# Patient Record
(demographics unavailable — no encounter records)

---

## 2025-05-06 NOTE — DISCUSSION/SUMMARY
[de-identified] : Kennedi Pressley is a 60-year-old female who presented to the office for follow-up of her left knee patella ORIF.  Overall, patient has been able to return to most activities.  She is able to straight leg raise.  X-rays showed patellar ORIF and hardware intact.  There is a persistent fracture line and reabsorption around the fracture.  Examination showed the patient is able to straight leg raise and has good knee range of motion.  Discussed with the patient the examination and imaging findings.  Discussed with patient that there is a persistent fracture line.  Discussed that her function is intact and she has been able to straight leg raise.  Discussed the management of her patellar ORIF at this time.  Patient will continue to be weightbearing as tolerated on the left lower extremity.  She will continue her activities as tolerated.  Discussed continued monitoring of her fracture line.   Discussed patient's prominent hardware.  Discussed that if patient develops skin issues or pain over the hardware, then could consider hardware removal.  Discussed complexity of hardware removal and risk of patella fracture/injury.  Discussed continuing to monitor prominent hardware. Patient will follow-up in 2 years for reevaluation and management.  Patient understanding and in agreement the plan.  All questions answered.  Plan: -Left lower extremity: Weightbearing as tolerated -Activity as tolerated -Follow up with Dr. Barrientos -Monitor prominent hardware -Follow-up in 2 years for reevaluation and management

## 2025-05-06 NOTE — DISCUSSION/SUMMARY
[de-identified] : Kennedi Pressley is a 60-year-old female who presented to the office for follow-up of her left knee patella ORIF.  Overall, patient has been able to return to most activities.  She is able to straight leg raise.  X-rays showed patellar ORIF and hardware intact.  There is a persistent fracture line and reabsorption around the fracture.  Examination showed the patient is able to straight leg raise and has good knee range of motion.  Discussed with the patient the examination and imaging findings.  Discussed with patient that there is a persistent fracture line.  Discussed that her function is intact and she has been able to straight leg raise.  Discussed the management of her patellar ORIF at this time.  Patient will continue to be weightbearing as tolerated on the left lower extremity.  She will continue her activities as tolerated.  Discussed continued monitoring of her fracture line.   Discussed patient's prominent hardware.  Discussed that if patient develops skin issues or pain over the hardware, then could consider hardware removal.  Discussed complexity of hardware removal and risk of patella fracture/injury.  Discussed continuing to monitor prominent hardware. Patient will follow-up in 2 years for reevaluation and management.  Patient understanding and in agreement the plan.  All questions answered.  Plan: -Left lower extremity: Weightbearing as tolerated -Activity as tolerated -Follow up with Dr. Barrientos -Monitor prominent hardware -Follow-up in 2 years for reevaluation and management

## 2025-05-06 NOTE — PHYSICAL EXAM
[de-identified] : Constitutional: 61 year old female, alert and oriented, cooperative, in no acute distress.  HEENT  NC/AT. Appearance: symmetric  Neck/Back Straight without deformity or instability. Good ROM.  Chest/Respiratory  Respiratory effort: no intercostal retractions or use of accessory muscles. Nonlabored Breathing  Skin  On inspection, warm and dry without rashes or lesions.  Mental Status:  Judgment, insight: intact Orientation: oriented to time, place, and person  Neurological: Sensory and Motor are grossly intact throughout  Left Knee  Incision appears to be well healed. No erythema or drainage. There is prominent hardware over the patella.  Patient able to straight leg raise with 5/5 strength. No extensor lag.  No significant knee swelling  No significant calf swelling or tenderness  Range of Motion:       Extension: 0 degrees       Flexion: 120 degrees       Extensor La Degrees  Neurologic Exam  Motor intact including 5/5 Extensor Hallucis Longus, 5/5 Flexor Hallucis Longus, 5/5 Tibialis Anterior and 5/5 Gastrocnemius  Sensation Intact to Light Touch including Saphenous, Sural, Superficial Peroneal, Deep Peroneal, Tibial nerve distributions   Vascular Exam: 2+ DP Pulse [de-identified] : XRay: XRays of the Left Knee (3 views) taken today in the office. XRays show a left patella open reduction and internal fixation. Hardware appears intact without signs of loosening. There is no patella baja or patella wu. There is no displacement of the fracture. There has been some resorption of the fracture site and a persistent fracture line. (my personal interpretation).

## 2025-05-06 NOTE — PHYSICAL EXAM
[de-identified] : Constitutional: 61 year old female, alert and oriented, cooperative, in no acute distress.  HEENT  NC/AT. Appearance: symmetric  Neck/Back Straight without deformity or instability. Good ROM.  Chest/Respiratory  Respiratory effort: no intercostal retractions or use of accessory muscles. Nonlabored Breathing  Skin  On inspection, warm and dry without rashes or lesions.  Mental Status:  Judgment, insight: intact Orientation: oriented to time, place, and person  Neurological: Sensory and Motor are grossly intact throughout  Left Knee  Incision appears to be well healed. No erythema or drainage. There is prominent hardware over the patella.  Patient able to straight leg raise with 5/5 strength. No extensor lag.  No significant knee swelling  No significant calf swelling or tenderness  Range of Motion:       Extension: 0 degrees       Flexion: 120 degrees       Extensor La Degrees  Neurologic Exam  Motor intact including 5/5 Extensor Hallucis Longus, 5/5 Flexor Hallucis Longus, 5/5 Tibialis Anterior and 5/5 Gastrocnemius  Sensation Intact to Light Touch including Saphenous, Sural, Superficial Peroneal, Deep Peroneal, Tibial nerve distributions   Vascular Exam: 2+ DP Pulse [de-identified] : XRay: XRays of the Left Knee (3 views) taken today in the office. XRays show a left patella open reduction and internal fixation. Hardware appears intact without signs of loosening. There is no patella baja or patella wu. There is no displacement of the fracture. There has been some resorption of the fracture site and a persistent fracture line. (my personal interpretation).

## 2025-05-06 NOTE — HISTORY OF PRESENT ILLNESS
[de-identified] : 5/6/2025  Kennedi Pressley presents to the office for follow-up of her left knee patella ORIF.  Patient is currently doing well overall.  She is able to straight leg raise.  She does not have left knee pain.  Patient can have some right leg pain.  Pain is located over the back and radiates down the leg.  She will follow-up with Dr. Barrientos.  5/2/2024  Kennedi Pressley  presents to the office for follow-up of her left knee patella ORIF.  Patient is overall doing well.  She is able to walk.  She is able straight leg raise.  She can have some left knee pain.  She has been experiencing some left leg pain that radiates from the back to the foot.  She was previously seen by Dr. Barrientos.  She reports some paresthesias.  No falls.  No fevers or chills.  No issues from the hardware.  8/31/2023  Kennedi Pressley presented to the office for follow-up of her left knee patella ORIF.  Patient is overall doing well.  She does have some lateral knee pain.  Patient has been able to straight leg raise and walk well.  She is participating in activities as tolerated.  Her back pain is being managed by Dr. Barrientos and she is scheduled for an upcoming MRI.  6/20/2023  Kennedi Pressley presents to the office for follow-up of her left knee patella ORIF.  Patient is overall doing well.  She has minimal knee pain with walking.  She does have some lateral knee pain, near her IT band.  She is not having significant patella pain at this time.  Patient does have some irritation to the hardware over the anterolateral knee.  Patient states that her main complaint is her lumbar spine at this time.  She is currently being managed by Dr. Barrientos.  4/18/2023  Ms. Pressley presented to the office for follow-up of her left knee patella ORIF.  Patient is doing well.  She does have some anterolateral knee pain that is worse with hip and knee flexion.  She is walking and participating in most activities.  Patient remains able to straight leg raise.  She has completed her physical therapy.  3/16/2023 Ms. Pressley is a 59-year-old female presents to the office for follow-up of her left patella open reduction and internal fixation. Patient underwent left patella ORIF on 11/9/2022. Intraoperatively, there was noted to be significant comminution of the distal pole of the patella. Patient required a patellar tendon repair in addition to open reduction and internal fixation. Patient is currently doing well. She is undergoing outpatient physical therapy. Patient is currently walking with the [employer] brace 0-90 degrees. She has participated in PT and started doing home exercises to increase her knee range of motion. She states that he knee range of motion has continued to improve. No significant pain at this time. She is walking with a cane.  Patient remains able to straight leg raise.  3/16/2023 Patient noted some left knee pain after sitting on the couch.  Pain occurs mostly with extension over the left lateral knee.  She is not having significant pain with ambulation or at rest.  Patient remains able to straight leg raise and able to flex the knee.  She states that she is otherwise doing well.  Patient reports some lower back pain and left knee pain.  She recently stopped physical therapy.  No fevers or chills.  History: HTN, HLD, Diabetes

## 2025-05-06 NOTE — HISTORY OF PRESENT ILLNESS
[de-identified] : 5/6/2025  Kennedi Pressley presents to the office for follow-up of her left knee patella ORIF.  Patient is currently doing well overall.  She is able to straight leg raise.  She does not have left knee pain.  Patient can have some right leg pain.  Pain is located over the back and radiates down the leg.  She will follow-up with Dr. Barrientos.  5/2/2024  Kennedi Pressley  presents to the office for follow-up of her left knee patella ORIF.  Patient is overall doing well.  She is able to walk.  She is able straight leg raise.  She can have some left knee pain.  She has been experiencing some left leg pain that radiates from the back to the foot.  She was previously seen by Dr. Barrientos.  She reports some paresthesias.  No falls.  No fevers or chills.  No issues from the hardware.  8/31/2023  Kennedi Pressley presented to the office for follow-up of her left knee patella ORIF.  Patient is overall doing well.  She does have some lateral knee pain.  Patient has been able to straight leg raise and walk well.  She is participating in activities as tolerated.  Her back pain is being managed by Dr. Barrientos and she is scheduled for an upcoming MRI.  6/20/2023  Kennedi Pressley presents to the office for follow-up of her left knee patella ORIF.  Patient is overall doing well.  She has minimal knee pain with walking.  She does have some lateral knee pain, near her IT band.  She is not having significant patella pain at this time.  Patient does have some irritation to the hardware over the anterolateral knee.  Patient states that her main complaint is her lumbar spine at this time.  She is currently being managed by Dr. Barrientos.  4/18/2023  Ms. Pressley presented to the office for follow-up of her left knee patella ORIF.  Patient is doing well.  She does have some anterolateral knee pain that is worse with hip and knee flexion.  She is walking and participating in most activities.  Patient remains able to straight leg raise.  She has completed her physical therapy.  3/16/2023 Ms. Pressley is a 59-year-old female presents to the office for follow-up of her left patella open reduction and internal fixation. Patient underwent left patella ORIF on 11/9/2022. Intraoperatively, there was noted to be significant comminution of the distal pole of the patella. Patient required a patellar tendon repair in addition to open reduction and internal fixation. Patient is currently doing well. She is undergoing outpatient physical therapy. Patient is currently walking with the [employer] brace 0-90 degrees. She has participated in PT and started doing home exercises to increase her knee range of motion. She states that he knee range of motion has continued to improve. No significant pain at this time. She is walking with a cane.  Patient remains able to straight leg raise.  3/16/2023 Patient noted some left knee pain after sitting on the couch.  Pain occurs mostly with extension over the left lateral knee.  She is not having significant pain with ambulation or at rest.  Patient remains able to straight leg raise and able to flex the knee.  She states that she is otherwise doing well.  Patient reports some lower back pain and left knee pain.  She recently stopped physical therapy.  No fevers or chills.  History: HTN, HLD, Diabetes

## 2025-06-18 NOTE — ADDENDUM
[FreeTextEntry1] :  I, Letitia Whittington, acted solely as a scribe for Dr. Broderick Barrientos MD on this date 06/18/2025    All medical record entries made by the Scribe were at my, Dr. Broderick Barrientos MD., direction and personally dictated by me on 06/18/2025 . I have reviewed the chart and agree that the record accurately reflects my personal performance of the history, physical exam, assessment and plan. I have also personally directed, reviewed, and agreed with the chart.

## 2025-06-18 NOTE — HISTORY OF PRESENT ILLNESS
[de-identified] : 60 yo female following up for her low back pain and radicular symptoms. She reports that she has right LE pain. She would be able to walk one block. Patient has attended physical therapy. Patient denies bowel/bladder incontinence. Denies saddle anesthesia.  05.13.24 59 yo female following up for her low back pain and radicular symptoms. She was last seen over a year ago. Since that time she had sustained a left patella fracture requiring surgical fixation.  Overall she is dealing with some fairly significant back and lower extremity symptoms.  She does have bilateral numbness in her feet and her legs whenever she walks 5 to 10 minutes.  She has known history of lumbar spinal stenosis as well.  3/27/2023 59 year old female who presents for initial evaluation of her lower back pain and radicular sxs down the interior aspect of her LT lower extremity. Patient reports onset of sxs after a fall in [employer] about 1 month ago. Patient reports since she has been ambulating with a cane for relief. Patient reports pain has been consistent and sharp. Patient reports taking Tylenol and Meloxicam for sxs with minor relief. Patient reports she started physical therapy for sxs.  Denies any saddle anesthesia, bowel/bladder incontinence

## 2025-06-18 NOTE — ASSESSMENT
[FreeTextEntry1] : I had a lengthy discussion with the patient in regard to their treatment plan and diagnosis. We discussed operative and nonoperative modalities of care. Discussed risks/benefits of surgery. Patient would like to avoid surgery. As a result, I would like to proceed with a referral to a pain management specialist to see if they are eligible for an epidural injection and other non-operative treatment options. In tandem with this, they should continue with physical therapy/home exercise program. The patient can take Tylenol/NSAIDs as needed for pain control if medically able to. I will have the patient follow up as needed. I encouraged the patient to follow-up sooner if there are any new or worsening symptoms.

## 2025-06-18 NOTE — PHYSICAL EXAM
[de-identified] : Lumbar Physical Exam  Gait -normal  Station - Normal  Sagittal balance - Normal  Compensatory mechanism? - None  Reflexes Patellar - normal Gastroc - normal Clonus - No  Hip Exam - Normal  Straight leg raise - none  Pulses - 2+ dp/pt  Range of motion - normal  Sensation Sensation intact to light touch in L1, L2, L3, L4, L5 and S1 dermatomes bilaterally  Motor  	IP	Quad	HS	TA	Gastroc	EHL Right	5/5	5/5	5/5	5/5	5/5	5/5 Left	5/5	5/5	5/5	5/5	5/5	5/5  [de-identified] : Previous imaging: Lumbar Radiographs reviewed  mild disc degenerative changes  facet arthropathy noted   Lumbar MRI reviewed Severe central stenosis noted in the lumbar spine